# Patient Record
Sex: MALE | Race: WHITE | NOT HISPANIC OR LATINO | Employment: OTHER | URBAN - METROPOLITAN AREA
[De-identification: names, ages, dates, MRNs, and addresses within clinical notes are randomized per-mention and may not be internally consistent; named-entity substitution may affect disease eponyms.]

---

## 2019-06-14 ENCOUNTER — OFFICE VISIT (OUTPATIENT)
Dept: OPHTHALMOLOGY | Facility: CLINIC | Age: 69
End: 2019-06-14
Payer: MEDICARE

## 2019-06-14 DIAGNOSIS — H52.7 REFRACTION DISORDER: ICD-10-CM

## 2019-06-14 DIAGNOSIS — H35.30 AMD (AGE-RELATED MACULAR DEGENERATION), BILATERAL: Primary | ICD-10-CM

## 2019-06-14 DIAGNOSIS — H35.371 EPIRETINAL MEMBRANE (ERM) OF RIGHT EYE: ICD-10-CM

## 2019-06-14 PROCEDURE — 99999 PR PBB SHADOW E&M-NEW PATIENT-LVL I: ICD-10-PCS | Mod: PBBFAC,,, | Performed by: OPHTHALMOLOGY

## 2019-06-14 PROCEDURE — 92015 PR REFRACTION: ICD-10-PCS | Mod: S$GLB,,, | Performed by: OPHTHALMOLOGY

## 2019-06-14 PROCEDURE — 92004 PR EYE EXAM, NEW PATIENT,COMPREHESV: ICD-10-PCS | Mod: S$GLB,,, | Performed by: OPHTHALMOLOGY

## 2019-06-14 PROCEDURE — 92134 POSTERIOR SEGMENT OCT RETINA (OCULAR COHERENCE TOMOGRAPHY)-BOTH EYES: ICD-10-PCS | Mod: S$GLB,,, | Performed by: OPHTHALMOLOGY

## 2019-06-14 PROCEDURE — 92134 CPTRZ OPH DX IMG PST SGM RTA: CPT | Mod: S$GLB,,, | Performed by: OPHTHALMOLOGY

## 2019-06-14 PROCEDURE — 92004 COMPRE OPH EXAM NEW PT 1/>: CPT | Mod: S$GLB,,, | Performed by: OPHTHALMOLOGY

## 2019-06-14 PROCEDURE — 92015 DETERMINE REFRACTIVE STATE: CPT | Mod: S$GLB,,, | Performed by: OPHTHALMOLOGY

## 2019-06-14 PROCEDURE — 99999 PR PBB SHADOW E&M-NEW PATIENT-LVL I: CPT | Mod: PBBFAC,,, | Performed by: OPHTHALMOLOGY

## 2019-06-14 RX ORDER — CYCLOBENZAPRINE HCL 5 MG
5 TABLET ORAL 3 TIMES DAILY PRN
COMMUNITY

## 2019-06-14 RX ORDER — MELOXICAM 7.5 MG/1
TABLET ORAL
COMMUNITY
Start: 2019-06-11

## 2019-06-14 NOTE — PROGRESS NOTES
HPI     Patient is here to establish care patient states headlight glare from   oncoming headlights is bothersome., last eye exam many years ago.    Last edited by MARY Cordero on 6/14/2019  9:40 AM. (History)            Assessment /Plan     For exam results, see Encounter Report.      ICD-10-CM ICD-9-CM    1. AMD (age-related macular degeneration), bilateral H35.30 362.50 Exam consistent with moderate age related macular degeneration with elevated risk findings. Discussed clinical condition and recommend avoidance of tobacco products.  Patient instructed in the use of daily Amsler Grid, AREDS II formula. Patient advised to call immediately if any Amsler Grid / visual changes occur.     RTC in 1years     2. Epiretinal membrane (ERM) of right eye H35.371 362.56 Posterior Segment OCT Retina-Both eyes on exam today and OCT   3. Refraction disorder H52.7 367.9        RETURN TO CLINIC 1 year

## 2019-10-17 ENCOUNTER — TELEPHONE (OUTPATIENT)
Dept: OPHTHALMOLOGY | Facility: CLINIC | Age: 69
End: 2019-10-17

## 2019-10-17 NOTE — TELEPHONE ENCOUNTER
----- Message from Minna Davila sent at 10/17/2019 12:55 PM CDT -----  Contact: self, [phone]  Patient requests to speak with you regarding his eyeglasses prescription. Please advise.

## 2019-11-04 ENCOUNTER — TELEPHONE (OUTPATIENT)
Dept: OPHTHALMOLOGY | Facility: CLINIC | Age: 69
End: 2019-11-04

## 2019-11-04 NOTE — TELEPHONE ENCOUNTER
----- Message from Sincere Hector sent at 11/4/2019 10:32 AM CST -----  Contact: pt   Pt would like to check on current script and see if it's just readers that he is being prescribed. pls return call today, pt states he left multiple messages.         ..[phone]

## 2019-11-08 ENCOUNTER — TELEPHONE (OUTPATIENT)
Dept: OPHTHALMOLOGY | Facility: CLINIC | Age: 69
End: 2019-11-08

## 2019-11-08 NOTE — TELEPHONE ENCOUNTER
Spoke with pt.  He had trouble with progressives when he tried them on at the optical shop.  Discussed various options at length with patient.  His distance correction is very mild, so having multifocal lenses is optional.  Discussed advantages and disadvantages of all options.  Offered appto have recheck.  He will speak with  and decide what he would like to do.  Advised him we can see him for recheck if needed.

## 2019-11-08 NOTE — TELEPHONE ENCOUNTER
----- Message from Kaylah Hansen sent at 11/8/2019 11:25 AM CST -----  Contact: self-[phone]  Would like to consult with the nurse, Patient has been trying to reach someone in the Office, and no one is returning his Call  Please call back at [phone] Thanks sj

## 2021-04-07 ENCOUNTER — OFFICE VISIT (OUTPATIENT)
Dept: OPHTHALMOLOGY | Facility: CLINIC | Age: 71
End: 2021-04-07
Payer: MEDICARE

## 2021-04-07 DIAGNOSIS — H25.11 SENILE NUCLEAR CATARACT, RIGHT: ICD-10-CM

## 2021-04-07 DIAGNOSIS — H35.30 AMD (AGE-RELATED MACULAR DEGENERATION), BILATERAL: ICD-10-CM

## 2021-04-07 DIAGNOSIS — H52.7 REFRACTION DISORDER: ICD-10-CM

## 2021-04-07 DIAGNOSIS — H25.12 SENILE NUCLEAR CATARACT, LEFT: ICD-10-CM

## 2021-04-07 DIAGNOSIS — H35.373 EPIRETINAL MEMBRANE (ERM) OF BOTH EYES: Primary | ICD-10-CM

## 2021-04-07 PROCEDURE — 92134 CPTRZ OPH DX IMG PST SGM RTA: CPT | Mod: S$GLB,,, | Performed by: OPHTHALMOLOGY

## 2021-04-07 PROCEDURE — 99214 OFFICE O/P EST MOD 30 MIN: CPT | Mod: S$GLB,,, | Performed by: OPHTHALMOLOGY

## 2021-04-07 PROCEDURE — 99999 PR PBB SHADOW E&M-EST. PATIENT-LVL II: ICD-10-PCS | Mod: PBBFAC,,, | Performed by: OPHTHALMOLOGY

## 2021-04-07 PROCEDURE — 1159F MED LIST DOCD IN RCRD: CPT | Mod: S$GLB,,, | Performed by: OPHTHALMOLOGY

## 2021-04-07 PROCEDURE — 99214 PR OFFICE/OUTPT VISIT, EST, LEVL IV, 30-39 MIN: ICD-10-PCS | Mod: S$GLB,,, | Performed by: OPHTHALMOLOGY

## 2021-04-07 PROCEDURE — 92134 POSTERIOR SEGMENT OCT RETINA (OCULAR COHERENCE TOMOGRAPHY)-BOTH EYES: ICD-10-PCS | Mod: S$GLB,,, | Performed by: OPHTHALMOLOGY

## 2021-04-07 PROCEDURE — 99999 PR PBB SHADOW E&M-EST. PATIENT-LVL II: CPT | Mod: PBBFAC,,, | Performed by: OPHTHALMOLOGY

## 2021-04-07 PROCEDURE — 1159F PR MEDICATION LIST DOCUMENTED IN MEDICAL RECORD: ICD-10-PCS | Mod: S$GLB,,, | Performed by: OPHTHALMOLOGY

## 2022-12-07 ENCOUNTER — OFFICE VISIT (OUTPATIENT)
Dept: OPHTHALMOLOGY | Facility: CLINIC | Age: 72
End: 2022-12-07
Payer: MEDICARE

## 2022-12-07 DIAGNOSIS — H25.13 NUCLEAR SCLEROTIC CATARACT OF BOTH EYES: ICD-10-CM

## 2022-12-07 DIAGNOSIS — H52.4 HYPEROPIA WITH PRESBYOPIA OF BOTH EYES: ICD-10-CM

## 2022-12-07 DIAGNOSIS — H35.373 EPIRETINAL MEMBRANE (ERM) OF BOTH EYES: ICD-10-CM

## 2022-12-07 DIAGNOSIS — H52.03 HYPEROPIA WITH PRESBYOPIA OF BOTH EYES: ICD-10-CM

## 2022-12-07 DIAGNOSIS — H35.30 AMD (AGE-RELATED MACULAR DEGENERATION), BILATERAL: Primary | ICD-10-CM

## 2022-12-07 PROCEDURE — 99999 PR PBB SHADOW E&M-EST. PATIENT-LVL II: ICD-10-PCS | Mod: PBBFAC,,, | Performed by: OPTOMETRIST

## 2022-12-07 PROCEDURE — 1159F MED LIST DOCD IN RCRD: CPT | Mod: CPTII,S$GLB,, | Performed by: OPTOMETRIST

## 2022-12-07 PROCEDURE — 1126F AMNT PAIN NOTED NONE PRSNT: CPT | Mod: CPTII,S$GLB,, | Performed by: OPTOMETRIST

## 2022-12-07 PROCEDURE — 1159F PR MEDICATION LIST DOCUMENTED IN MEDICAL RECORD: ICD-10-PCS | Mod: CPTII,S$GLB,, | Performed by: OPTOMETRIST

## 2022-12-07 PROCEDURE — 92134 CPTRZ OPH DX IMG PST SGM RTA: CPT | Mod: S$GLB,,, | Performed by: OPTOMETRIST

## 2022-12-07 PROCEDURE — 92015 DETERMINE REFRACTIVE STATE: CPT | Mod: S$GLB,,, | Performed by: OPTOMETRIST

## 2022-12-07 PROCEDURE — 92004 COMPRE OPH EXAM NEW PT 1/>: CPT | Mod: S$GLB,,, | Performed by: OPTOMETRIST

## 2022-12-07 PROCEDURE — 99999 PR PBB SHADOW E&M-EST. PATIENT-LVL II: CPT | Mod: PBBFAC,,, | Performed by: OPTOMETRIST

## 2022-12-07 PROCEDURE — 92134 OCT, RETINA - OU - BOTH EYES: ICD-10-PCS | Mod: S$GLB,,, | Performed by: OPTOMETRIST

## 2022-12-07 PROCEDURE — 92015 PR REFRACTION: ICD-10-PCS | Mod: S$GLB,,, | Performed by: OPTOMETRIST

## 2022-12-07 PROCEDURE — 1126F PR PAIN SEVERITY QUANTIFIED, NO PAIN PRESENT: ICD-10-PCS | Mod: CPTII,S$GLB,, | Performed by: OPTOMETRIST

## 2022-12-07 PROCEDURE — 92004 PR EYE EXAM, NEW PATIENT,COMPREHESV: ICD-10-PCS | Mod: S$GLB,,, | Performed by: OPTOMETRIST

## 2022-12-08 NOTE — PROGRESS NOTES
HPI     Annual Exam            Comments: New patient to [facility]   Last eye exam was with [facility] 4/07/21          Comments    Vision changes since last eye exam?: Yes, distance and near     Any eye pain today: No    Other ocular symptoms: No    Interested in contact lens fitting today? No             Last edited by Bety Blanchard, PCT on 12/7/2022  3:40 PM.            Assessment /Plan     For exam results, see Encounter Report.    AMD (age-related macular degeneration), bilateral  -     OCT, Retina - OU - Both Eyes    Exam findings are consistent with moderate age related macular degeneration OS > OD. Recommended avoiding tobacco. Discussed dietary considerations (i.e. Green leafy vegetables, colorful vegetables and cold water fish) that may be beneficial to patients with AMD.  Instructions were given for daily amsler grid use. Recommended patient begin AREDS vitamins.  Instructed to return to clinic as soon as possible if vision changes before next follow-up appointment.      Epiretinal membrane (ERM) of both eyes  -     OCT, Retina - OU - Both Eyes  Stable, not visually significant. Observe.     Nuclear sclerotic cataract of both eyes  Cataracts are not visually significant and not affecting activities of daily living. Annual observation is recommended at this time. Patient to call or RTC with any significant change in vision prior to next visit.    Hyperopia with presbyopia of both eyes  Eyeglass Final Rx       Eyeglass Final Rx         Sphere Cylinder Axis Add    Right +0.25 +0.25 050 +2.50    Left +0.50 DS  +2.50      Type: PAL    Expiration Date: 12/7/2023   PD-[id number]                 Ok to continue OTC readers +2.50DS PRN.       RTC 1 yr for dilated eye exam with mOCT or sooner if any changes to vision.   Discussed above and answered questions.